# Patient Record
Sex: MALE | Race: WHITE | NOT HISPANIC OR LATINO
[De-identification: names, ages, dates, MRNs, and addresses within clinical notes are randomized per-mention and may not be internally consistent; named-entity substitution may affect disease eponyms.]

---

## 2020-03-05 PROBLEM — Z00.00 ENCOUNTER FOR PREVENTIVE HEALTH EXAMINATION: Status: ACTIVE | Noted: 2020-03-05

## 2022-06-06 ENCOUNTER — APPOINTMENT (OUTPATIENT)
Dept: NEUROLOGY | Facility: CLINIC | Age: 52
End: 2022-06-06
Payer: COMMERCIAL

## 2022-06-06 VITALS
HEIGHT: 69 IN | BODY MASS INDEX: 40.88 KG/M2 | DIASTOLIC BLOOD PRESSURE: 76 MMHG | SYSTOLIC BLOOD PRESSURE: 118 MMHG | WEIGHT: 276 LBS

## 2022-06-06 DIAGNOSIS — Z86.39 PERSONAL HISTORY OF OTHER ENDOCRINE, NUTRITIONAL AND METABOLIC DISEASE: ICD-10-CM

## 2022-06-06 PROCEDURE — 99204 OFFICE O/P NEW MOD 45 MIN: CPT

## 2022-06-06 NOTE — HISTORY OF PRESENT ILLNESS
[FreeTextEntry1] : He reports trouble focusing and concentrating most of his life.  He was diagnosed with attention deficit disorder by neurologist.  No records available.  He was on Strattera till about 7 years ago.  Said low dosages seemed to help.  He is now having increasing difficulty concentrating.  Feels he needs to go back on medication.\par \par Medical history significant for diabetes and hyperlipidemia\par \par He is a teacher at [employer]

## 2022-06-06 NOTE — CONSULT LETTER
[Dear  ___] : Dear  [unfilled], [Consult Letter:] : I had the pleasure of evaluating your patient, [unfilled]. [Please see my note below.] : Please see my note below. [Consult Closing:] : Thank you very much for allowing me to participate in the care of this patient.  If you have any questions, please do not hesitate to contact me. [Sincerely,] : Sincerely, [FreeTextEntry3] : Walter Terry MD, PhD, DPN-N\par [facility]\par Neurology at [place]\par Chief, Division of Neurology\par [facility]\par

## 2022-06-06 NOTE — ASSESSMENT
[FreeTextEntry1] : Attention deficit\par We will reintroduce Strattera 25 mg a day\par Follow-up in 1 month and by phone prior to that for any issues that may arise.\par

## 2022-07-13 ENCOUNTER — APPOINTMENT (OUTPATIENT)
Dept: NEUROLOGY | Facility: CLINIC | Age: 52
End: 2022-07-13

## 2022-07-13 VITALS
BODY MASS INDEX: 39.25 KG/M2 | SYSTOLIC BLOOD PRESSURE: 120 MMHG | WEIGHT: 265 LBS | HEIGHT: 69 IN | DIASTOLIC BLOOD PRESSURE: 78 MMHG

## 2022-07-13 PROCEDURE — 99214 OFFICE O/P EST MOD 30 MIN: CPT

## 2022-07-13 NOTE — HISTORY OF PRESENT ILLNESS
[FreeTextEntry1] : I saw him initially 6/6/2022 with the following history.  He reports trouble focusing and concentrating most of his life.  He was diagnosed with attention deficit disorder by neurologist.  No records available.  He was on Strattera till about 7 years ago.  Said low dosages seemed to help.  He is now having increasing difficulty concentrating.  Feels he needs to go back on medication.\par \par Medical history significant for diabetes and hyperlipidemia\par \par He is a teacher at [employer]\par \par I started him on Strattera 25 mg a day.  Says it is helping a little but feels he needs a somewhat stronger dose.  He is having no problem with the medication.

## 2022-07-13 NOTE — CONSULT LETTER
[Dear  ___] : Dear  [unfilled], [Consult Letter:] : I had the pleasure of evaluating your patient, [unfilled]. [Please see my note below.] : Please see my note below. [Consult Closing:] : Thank you very much for allowing me to participate in the care of this patient.  If you have any questions, please do not hesitate to contact me. [Sincerely,] : Sincerely, [FreeTextEntry3] : Walter Terry MD, PhD, DPN-N\par [facility]\par Neurology at [place]\par Chief, Division of Neurology\par [facility]\par

## 2022-09-14 ENCOUNTER — APPOINTMENT (OUTPATIENT)
Dept: NEUROLOGY | Facility: CLINIC | Age: 52
End: 2022-09-14

## 2022-09-14 VITALS
DIASTOLIC BLOOD PRESSURE: 80 MMHG | HEIGHT: 69 IN | BODY MASS INDEX: 39.25 KG/M2 | SYSTOLIC BLOOD PRESSURE: 120 MMHG | WEIGHT: 265 LBS

## 2022-09-14 PROCEDURE — 99214 OFFICE O/P EST MOD 30 MIN: CPT

## 2022-09-14 NOTE — HISTORY OF PRESENT ILLNESS
[FreeTextEntry1] : I saw him initially 6/6/2022 with the following history.  He reports trouble focusing and concentrating most of his life.  He was diagnosed with attention deficit disorder by neurologist.  No records available.  He was on Strattera till about 7 years ago.  Said low dosages seemed to help.  He is now having increasing difficulty concentrating.  Feels he needs to go back on medication.\par \par Medical history significant for diabetes and hyperlipidemia\par \par He is a teacher at [employer]\par \par I started him on Strattera 25 mg a day.  Says it is helping a little but felt he needed a somewhat stronger dose.  He increased it to 40 mg a day.  Doing better on that.

## 2022-09-14 NOTE — ASSESSMENT
[FreeTextEntry1] : Attention deficit\par We will continue Strattera 40 mg a day\par Follow-up in 3 months and by phone prior to that for any issues that may arise.\par

## 2022-09-14 NOTE — CONSULT LETTER
[Dear  ___] : Dear  [unfilled], [Consult Letter:] : I had the pleasure of evaluating your patient, [unfilled]. [Please see my note below.] : Please see my note below. [Consult Closing:] : Thank you very much for allowing me to participate in the care of this patient.  If you have any questions, please do not hesitate to contact me. [Sincerely,] : Sincerely, [FreeTextEntry3] : Walter Terry MD, PhD, DPN-N\par [facility]\par Neurology at [place]\par Chief, Division of Neurology\par [facility]\par

## 2022-10-24 NOTE — ASSESSMENT
[FreeTextEntry1] : Attention deficit\par We will increase the Strattera to 40 mg a day\par Follow-up in 2 months and by phone prior to that for any issues that may arise.\par  Mirvaso Counseling: Mirvaso is a topical medication which can decrease superficial blood flow where applied. Side effects are uncommon and include stinging, redness and allergic reactions.

## 2022-12-27 ENCOUNTER — APPOINTMENT (OUTPATIENT)
Dept: NEUROLOGY | Facility: CLINIC | Age: 52
End: 2022-12-27

## 2022-12-27 VITALS
SYSTOLIC BLOOD PRESSURE: 118 MMHG | WEIGHT: 265 LBS | HEIGHT: 69 IN | BODY MASS INDEX: 39.25 KG/M2 | DIASTOLIC BLOOD PRESSURE: 68 MMHG

## 2022-12-27 PROCEDURE — 99214 OFFICE O/P EST MOD 30 MIN: CPT

## 2022-12-27 NOTE — HISTORY OF PRESENT ILLNESS
[FreeTextEntry1] : I saw him initially 6/6/2022 with the following history.  He reports trouble focusing and concentrating most of his life.  He was diagnosed with attention deficit disorder by neurologist.  No records available.  He was on Strattera till about 7 years ago.  Said low dosages seemed to help.  He is now having increasing difficulty concentrating.  Feels he needs to go back on medication.\par \par Medical history significant for diabetes and hyperlipidemia\par \par He is a teacher at [employer]\par \par I started him on Strattera 25 mg a day.  Says it is helping a little but felt he needed a somewhat stronger dose.  He increased it to 40 mg a day.  Initially working reasonably well but now finding that it is having no effect.

## 2022-12-27 NOTE — PHYSICAL EXAM
[General Appearance - Alert] : alert [General Appearance - In No Acute Distress] : in no acute distress [General Appearance - Well-Appearing] : healthy appearing [Oriented To Time, Place, And Person] : oriented to person, place, and time [Impaired Insight] : insight and judgment were intact [Affect] : the affect was normal [Memory Recent] : recent memory was not impaired [Person] : oriented to person [Place] : oriented to place [Time] : oriented to time [Concentration Intact] : normal concentrating ability [Fluency] : fluency intact [Comprehension] : comprehension intact [Cranial Nerves Optic (II)] : visual acuity intact bilaterally,  visual fields full to confrontation, pupils equal round and reactive to light [Cranial Nerves Oculomotor (III)] : extraocular motion intact [Cranial Nerves Trigeminal (V)] : facial sensation intact symmetrically [Cranial Nerves Facial (VII)] : face symmetrical [Cranial Nerves Vestibulocochlear (VIII)] : hearing was intact bilaterally [Cranial Nerves Glossopharyngeal (IX)] : tongue and palate midline [Cranial Nerves Accessory (XI - Cranial And Spinal)] : head turning and shoulder shrug symmetric [Cranial Nerves Hypoglossal (XII)] : there was no tongue deviation with protrusion [Motor Tone] : muscle tone was normal in all four extremities [Motor Strength] : muscle strength was normal in all four extremities [No Muscle Atrophy] : normal bulk in all four extremities [Paresis Pronator Drift Right-Sided] : no pronator drift on the right [Paresis Pronator Drift Left-Sided] : no pronator drift on the left [Sensation Tactile Decrease] : light touch was intact [Sensation Pain / Temperature Decrease] : pain and temperature was intact [Romberg's Sign] : Romberg's sign was negtive [Abnormal Walk] : normal gait [Balance] : balance was intact [Past-pointing] : there was no past-pointing [Tremor] : no tremor present [Coordination - Dysmetria Impaired Finger-to-Nose Bilateral] : not present [Coordination - Dysmetria Impaired Heel-to-Shin Bilateral] : not present [2+] : Ankle jerk left 2+ [Plantar Reflex Right Only] : normal on the right [Plantar Reflex Left Only] : normal on the left [PERRL With Normal Accommodation] : pupils were equal in size, round, reactive to light, with normal accommodation [Full Visual Field] : full visual field [Extraocular Movements] : extraocular movements were intact

## 2022-12-27 NOTE — CONSULT LETTER
[Dear  ___] : Dear  [unfilled], [Consult Letter:] : I had the pleasure of evaluating your patient, [unfilled]. [Please see my note below.] : Please see my note below. [Consult Closing:] : Thank you very much for allowing me to participate in the care of this patient.  If you have any questions, please do not hesitate to contact me. [Sincerely,] : Sincerely, [FreeTextEntry3] : Walter Terry MD, PhD, DPN-N\par [facility]\par Neurology at [place]\par Chief, Division of Neurology\par [facility]\par

## 2022-12-27 NOTE — ASSESSMENT
[FreeTextEntry1] : Attention deficit\par We will increase the Strattera to 80 mg a day.\par Follow-up in 3 months and by phone prior to that for any issues that may arise.\par

## 2023-03-28 ENCOUNTER — APPOINTMENT (OUTPATIENT)
Dept: NEUROLOGY | Facility: CLINIC | Age: 53
End: 2023-03-28
Payer: COMMERCIAL

## 2023-03-28 VITALS
WEIGHT: 265 LBS | HEIGHT: 69 IN | SYSTOLIC BLOOD PRESSURE: 130 MMHG | BODY MASS INDEX: 39.25 KG/M2 | DIASTOLIC BLOOD PRESSURE: 70 MMHG

## 2023-03-28 DIAGNOSIS — F98.8 OTHER SPECIFIED BEHAVIORAL AND EMOTIONAL DISORDERS WITH ONSET USUALLY OCCURRING IN CHILDHOOD AND ADOLESCENCE: ICD-10-CM

## 2023-03-28 PROCEDURE — 99214 OFFICE O/P EST MOD 30 MIN: CPT

## 2023-03-28 RX ORDER — ATOMOXETINE 25 MG/1
25 CAPSULE ORAL
Qty: 30 | Refills: 1 | Status: COMPLETED | COMMUNITY
Start: 2022-06-06 | End: 2023-03-28

## 2023-03-28 RX ORDER — ATOMOXETINE 80 MG/1
80 CAPSULE ORAL DAILY
Qty: 30 | Refills: 3 | Status: COMPLETED | COMMUNITY
Start: 2022-12-27 | End: 2023-03-28

## 2023-03-28 RX ORDER — ATOMOXETINE 40 MG/1
40 CAPSULE ORAL
Qty: 30 | Refills: 3 | Status: COMPLETED | COMMUNITY
Start: 2022-07-13 | End: 2023-03-28

## 2023-03-28 NOTE — ASSESSMENT
[FreeTextEntry1] : Possible attention deficit\par Strattera helped but bothered his stomach\par Says he does not want to be on any stimulant medication because they make him jittery\par I explained to him that most ADD medications are stimulants\par I am suggesting a formal neuropsychological evaluation for an updated diagnosis.\par  today he says that he has forgetfulness.\par

## 2023-03-28 NOTE — HISTORY OF PRESENT ILLNESS
[FreeTextEntry1] : I saw him initially 6/6/2022 with the following history.  He reports trouble focusing and concentrating most of his life.  He was diagnosed with attention deficit disorder by neurologist.  No records available.  He was on Strattera till about 7 years ago.  Said low dosages seemed to help.  He is now having increasing difficulty concentrating.  Feels he needs to go back on medication.\par \par Medical history significant for diabetes and hyperlipidemia\par \par He is a teacher at [employer]\par \par I started him on Strattera 25 mg a day.  It was titrated up to 80 mg a day.  Says it was helping but was bothering his stomach a great deal so he stopped it.  He is asking if there are any alternatives.

## 2023-03-28 NOTE — CONSULT LETTER
[Dear  ___] : Dear  [unfilled], [Consult Letter:] : I had the pleasure of evaluating your patient, [unfilled]. [Please see my note below.] : Please see my note below. [Consult Closing:] : Thank you very much for allowing me to participate in the care of this patient.  If you have any questions, please do not hesitate to contact me. [Sincerely,] : Sincerely, [FreeTextEntry3] : Walter Terry MD, PhD, DPN-N\par [facility]\par Neurology at [place]\par Chief, Division of Neurology\par [facility]\par

## 2024-11-25 ENCOUNTER — OFFICE (OUTPATIENT)
Dept: URBAN - METROPOLITAN AREA CLINIC 115 | Facility: CLINIC | Age: 54
Setting detail: OPHTHALMOLOGY
End: 2024-11-25
Payer: COMMERCIAL

## 2024-11-25 DIAGNOSIS — H25.13: ICD-10-CM

## 2024-11-25 DIAGNOSIS — E11.3293: ICD-10-CM

## 2024-11-25 DIAGNOSIS — Z79.84: ICD-10-CM

## 2024-11-25 DIAGNOSIS — H11.153: ICD-10-CM

## 2024-11-25 PROCEDURE — 92004 COMPRE OPH EXAM NEW PT 1/>: CPT | Performed by: OPHTHALMOLOGY

## 2024-11-25 ASSESSMENT — REFRACTION_CURRENTRX
OS_OVR_VA: 20/
OD_VPRISM_DIRECTION: SV
OD_CYLINDER: -0.75
OD_CYLINDER: -0.75
OD_OVR_VA: 20/
OS_CYLINDER: 0.00
OS_VPRISM_DIRECTION: SV
OD_SPHERE: -1.00
OS_OVR_VA: 20/
OS_CYLINDER: 0.00
OD_AXIS: 177
OD_AXIS: 179
OS_SPHERE: -1.75
OS_VPRISM_DIRECTION: SV
OD_VPRISM_DIRECTION: SV
OS_AXIS: 180
OD_OVR_VA: 20/
OS_SPHERE: -1.50
OS_AXIS: 180
OD_SPHERE: -1.00

## 2024-11-25 ASSESSMENT — REFRACTION_AUTOREFRACTION
OS_AXIS: 067
OD_SPHERE: -0.50
OD_AXIS: 116
OD_CYLINDER: -0.75
OS_SPHERE: -0.75
OS_CYLINDER: -0.50

## 2024-11-25 ASSESSMENT — REFRACTION_MANIFEST
OD_AXIS: 170
OD_CYLINDER: -0.75
OS_VA1: 20/20
OS_SPHERE: -0.75
OD_AXIS: 175
OD_VA1: 20/20
OD_VA1: 20/20
OS_VA1: 20/20
OS_CYLINDER: -0.50
OD_CYLINDER: -0.75
OD_SPHERE: -0.50
OS_SPHERE: -1.75
OS_AXIS: 070
OD_SPHERE: -1.00
OS_CYLINDER: SPH

## 2024-11-25 ASSESSMENT — CONFRONTATIONAL VISUAL FIELD TEST (CVF)
OD_FINDINGS: FULL
OS_FINDINGS: FULL

## 2024-11-25 ASSESSMENT — VISUAL ACUITY
OD_BCVA: 20/30
OS_BCVA: 20/40-1

## 2024-11-25 ASSESSMENT — TONOMETRY: OD_IOP_MMHG: 21
